# Patient Record
Sex: MALE | Race: WHITE | NOT HISPANIC OR LATINO | Employment: PART TIME | URBAN - METROPOLITAN AREA
[De-identification: names, ages, dates, MRNs, and addresses within clinical notes are randomized per-mention and may not be internally consistent; named-entity substitution may affect disease eponyms.]

---

## 2017-02-24 ENCOUNTER — OFFICE VISIT (OUTPATIENT)
Dept: URGENT CARE | Facility: URGENT CARE | Age: 47
End: 2017-02-24
Payer: COMMERCIAL

## 2017-02-24 VITALS
SYSTOLIC BLOOD PRESSURE: 116 MMHG | TEMPERATURE: 98 F | HEART RATE: 67 BPM | OXYGEN SATURATION: 97 % | WEIGHT: 182 LBS | DIASTOLIC BLOOD PRESSURE: 88 MMHG

## 2017-02-24 DIAGNOSIS — H18.821: Primary | ICD-10-CM

## 2017-02-24 DIAGNOSIS — H10.31 ACUTE BACTERIAL CONJUNCTIVITIS OF RIGHT EYE: ICD-10-CM

## 2017-02-24 PROCEDURE — 99203 OFFICE O/P NEW LOW 30 MIN: CPT | Performed by: FAMILY MEDICINE

## 2017-02-24 RX ORDER — POLYMYXIN B SULFATE AND TRIMETHOPRIM 1; 10000 MG/ML; [USP'U]/ML
1 SOLUTION OPHTHALMIC EVERY 4 HOURS
Qty: 10 ML | Refills: 0 | Status: SHIPPED | OUTPATIENT
Start: 2017-02-24 | End: 2017-03-02

## 2017-02-24 NOTE — MR AVS SNAPSHOT
After Visit Summary   2/24/2017    Jamie Lockwood    MRN: [id number]           Patient Information     Date Of Birth          1970        Visit Information        Provider Department      2/24/2017 1:00 PM Provider, [facility]        Today's Diagnoses     Contact lens overwear, right    -  1    Acute bacterial conjunctivitis of right eye          Care Instructions      Conjunctivitis Caused by Infection  Infections are caused by viruses or germs (bacteria). Treatment includes keeping your eyes and hands clean. Your health care provider may prescribe eye drops, and tell you to stay home from work or school if you re contagious. Untreated infections can be serious. It's important to see your provider for a diagnosis.    Viral infections  A cold, flu, or other virus can spread to your eyes. This causes a watery discharge. Your eyes may burn or itch and get red. Your eyelids may also be puffy and sore.  Treatment  Most viral infections go away on their own. Artificial tears and warm compresses can relieve symptoms. Your provider may also prescribe eye drops. A viral infection can be very contagious and spreads quickly. To prevent this, wash your hands often. Use a separate tissue to wipe each eye. Don t touch your eyes or share bedding or towels.   Bacterial infections  Bacterial infections often occur in one eye. There may be a watery or a thick discharge from the eye. These infections can cause serious damage to your eye if not treated promptly.  Treatment  Your provider may prescribe eye drops or ointment to kill the bacteria. Warm compresses can help keep the eyelids clean. To keep the bacteria from spreading, wash your hands often. Use a separate tissue to wipe each eye. Don t touch your eyes or share bedding or towels.    1355-6957 [employer] [address] [place] [address]. All rights reserved. This information is not intended as a substitute  "for professional medical care. Always follow your healthcare professional's instructions.              Follow-ups after your visit        Who to contact     If you have questions or need follow up information about today's clinic visit or your schedule please contact [facility] directly at [phone].  Normal or non-critical lab and imaging results will be communicated to you by MyChart, letter or phone within 4 business days after the clinic has received the results. If you do not hear from us within 7 days, please contact the clinic through MyChart or phone. If you have a critical or abnormal lab result, we will notify you by phone as soon as possible.  Submit refill requests through [employer] or call your pharmacy and they will forward the refill request to us. Please allow 3 business days for your refill to be completed.          Additional Information About Your Visit        MyChart Information     [employer] lets you send messages to your doctor, view your test results, renew your prescriptions, schedule appointments and more. To sign up, go to [email or web address] . Click on \"Log in\" on the left side of the screen, which will take you to the Welcome page. Then click on \"Sign up Now\" on the right side of the page.     You will be asked to enter the access code listed below, as well as some personal information. Please follow the directions to create your username and password.     Your access code is: [email or web address]  Expires: 2017  1:24 PM     Your access code will  in 90 days. If you need help or a new code, please call your [place] clinic or [phone].        Care EveryWhere ID     This is your Care EveryWhere ID. This could be used by other organizations to access your [place] medical records  [id number]        Your Vitals Were     Pulse Temperature Pulse Oximetry             67 98  F (36.7  C) (Tympanic) 97%          Blood Pressure from Last 3 Encounters:   17 116/88 "    Weight from Last 3 Encounters:   02/24/17 182 lb (82.6 kg)              Today, you had the following     No orders found for display         Today's Medication Changes          These changes are accurate as of: 2/24/17  1:24 PM.  If you have any questions, ask your nurse or doctor.               Start taking these medicines.        Dose/Directions    trimethoprim-polymyxin b ophthalmic solution   Commonly known as:  POLYTRIM   Used for:  Contact lens overwear, right, Acute bacterial conjunctivitis of right eye   Started by:  ProviderBertha        Dose:  1 drop   Apply 1 drop to eye every 4 hours for 6 days   Quantity:  10 mL   Refills:  0            Where to get your medicines      These medications were sent to [email or web address] [id number] [address] SEC of [facility]  [address] [address]     Phone:  [phone]     trimethoprim-polymyxin b ophthalmic solution                Primary Care Provider Office Phone # Fax #    Marco Waite [phone]       [facility] [address]  [facility] [address]        Thank you!     Thank you for choosing [facility]  for your care. Our goal is always to provide you with excellent care. Hearing back from our patients is one way we can continue to improve our services. Please take a few minutes to complete the written survey that you may receive in the mail after your visit with us. Thank you!             Your Updated Medication List - Protect others around you: Learn how to safely use, store and throw away your medicines at www.disposemymeds.org.          This list is accurate as of: 2/24/17  1:24 PM.  Always use your most recent med list.                   Brand Name Dispense Instructions for use    LIPITOR PO          trimethoprim-polymyxin b ophthalmic solution    POLYTRIM    10 mL    Apply 1 drop to eye every 4 hours for 6 days

## 2017-02-24 NOTE — NURSING NOTE
Chief Complaint   Patient presents with     Urgent Care     Eye Problem     Right eye started hurting 1 week ago. Still has not improved. Reddness, pain and swelling. No changes in vision.        Initial /88  Pulse 67  Temp 98  F (36.7  C) (Tympanic)  Wt 182 lb (82.6 kg)  SpO2 97% There is no height or weight on file to calculate BMI.  Medication Reconciliation: complete    Deborah Mae

## 2017-02-24 NOTE — PROGRESS NOTES
SUBJECTIVE:     Chief Complaint   Patient presents with     Urgent Care     Eye Problem     Right eye started hurting 1 week ago. Still has not improved. Reddness, pain and swelling. No changes in vision.      History of Present Illness:  Jamie Lockwood is a 46 year old male who presents complaining of moderate right eye mattering, pain, redness for 1 week(s).   Onset/timing: gradual, improving with taking out his contacts and then recurrence 1 day ago    Associated Signs and Symptoms: none  Treatment measures tried include: flushed with water  and stopped using contacts and used glasses     No past medical history on file.    ALLERGIES:  Review of patient's allergies indicates no known allergies.      No current outpatient prescriptions on file prior to visit.  No current facility-administered medications on file prior to visit.     Social History   Substance Use Topics     Smoking status: Never Smoker     Smokeless tobacco: Not on file     Alcohol use Not on file       No family history on file.      ROS:  INTEGUMENTARY/SKIN: NEGATIVE for worrisome rashes, moles or lesions  ENT/MOUTH: NEGATIVE for ear, mouth and throat problems  RESP:NEGATIVE for significant cough or SOB  GI: NEGATIVE for nausea, abdominal pain, heartburn, or change in bowel habits    OBJECTIVE:  /88  Pulse 67  Temp 98  F (36.7  C) (Tympanic)  Wt 182 lb (82.6 kg)  SpO2 97%  General: no acute distress  Right eye:ARABELLA, EOMI, fundi normal, corneas normal, no foreign bodies, flourescein staining is negative for  Scratch, though faint haze over the cornea, no periorbital cellulitis  Left eye: ARABELLA, EOMI, fundi normal, corneas normal, no foreign bodies, , no periorbital cellulitis     Ears: normal canals, TMs bilaterally, normal TM mobility  Nose: NORMAL - no drainage, turbinates normal in size.  Neck: supple, non-tender, free range of motion, no adenopathy    ASSESSMENT/ PLAN  Contact lens overwear, right     - trimethoprim-polymyxin b  (POLYTRIM) ophthalmic solution; Apply 1 drop to eye every 4 hours for 6 days    Acute bacterial conjunctivitis of right eye     - trimethoprim-polymyxin b (POLYTRIM) ophthalmic solution; Apply 1 drop to eye every 4 hours for 6 days      To ER if acute change in vision,

## 2017-02-24 NOTE — PATIENT INSTRUCTIONS
Conjunctivitis Caused by Infection  Infections are caused by viruses or germs (bacteria). Treatment includes keeping your eyes and hands clean. Your health care provider may prescribe eye drops, and tell you to stay home from work or school if you re contagious. Untreated infections can be serious. It's important to see your provider for a diagnosis.    Viral infections  A cold, flu, or other virus can spread to your eyes. This causes a watery discharge. Your eyes may burn or itch and get red. Your eyelids may also be puffy and sore.  Treatment  Most viral infections go away on their own. Artificial tears and warm compresses can relieve symptoms. Your provider may also prescribe eye drops. A viral infection can be very contagious and spreads quickly. To prevent this, wash your hands often. Use a separate tissue to wipe each eye. Don t touch your eyes or share bedding or towels.   Bacterial infections  Bacterial infections often occur in one eye. There may be a watery or a thick discharge from the eye. These infections can cause serious damage to your eye if not treated promptly.  Treatment  Your provider may prescribe eye drops or ointment to kill the bacteria. Warm compresses can help keep the eyelids clean. To keep the bacteria from spreading, wash your hands often. Use a separate tissue to wipe each eye. Don t touch your eyes or share bedding or towels.    [id number] [employer]. [address] [place] [address], [place] [address]. All rights reserved. This information is not intended as a substitute for professional medical care. Always follow your healthcare professional's instructions.

## 2024-05-24 DIAGNOSIS — J45.909 ASTHMA: Primary | ICD-10-CM

## 2024-06-23 ENCOUNTER — HEALTH MAINTENANCE LETTER (OUTPATIENT)
Age: 54
End: 2024-06-23

## 2025-05-29 ENCOUNTER — OFFICE VISIT (OUTPATIENT)
Dept: URGENT CARE | Facility: URGENT CARE | Age: 55
End: 2025-05-29
Payer: COMMERCIAL

## 2025-05-29 VITALS
HEART RATE: 115 BPM | TEMPERATURE: 97.5 F | RESPIRATION RATE: 16 BRPM | WEIGHT: 204.6 LBS | BODY MASS INDEX: 27.71 KG/M2 | HEIGHT: 72 IN | DIASTOLIC BLOOD PRESSURE: 78 MMHG | OXYGEN SATURATION: 96 % | SYSTOLIC BLOOD PRESSURE: 120 MMHG

## 2025-05-29 DIAGNOSIS — H10.13 ALLERGIC CONJUNCTIVITIS, BILATERAL: Primary | ICD-10-CM

## 2025-05-29 RX ORDER — OLOPATADINE HYDROCHLORIDE 2 MG/ML
1 SOLUTION OPHTHALMIC DAILY
Qty: 2.5 ML | Refills: 0 | Status: SHIPPED | OUTPATIENT
Start: 2025-05-29

## 2025-05-29 ASSESSMENT — ENCOUNTER SYMPTOMS
EYE PAIN: 0
EYE REDNESS: 1
PHOTOPHOBIA: 0
COUGH: 0
SORE THROAT: 0
EYE ITCHING: 1

## 2025-05-29 NOTE — PROGRESS NOTES
ASSESSMENT AND PLAN:      ICD-10-CM    1. Allergic conjunctivitis, bilateral  H10.13 olopatadine (PATADAY) 0.2 % ophthalmic solution        Failed treatment with topical antibiotics.  Discussed symptoms most likely related to allergies.  Allergy symptoms may be masked by his daily Allegra and Flonase.  Eye redness most likely related to allergies.  Recommended washing face with cold water twice daily.  Allergy eyedrops twice daily.  Shower after being outside in change of clothes.  Close windows and turn on AC during pollen season.    If symptoms are not controlled with these recommendations.  Could see allergist if may be committed to allergy shots.      Kristel Lee MD  [facility]    Subjective     Jamie Lockwood is a 54 year old who presents for Patient presents with:  Urgent Care: Pt reports redness in both eyes, started in the right eye but now spreading to the left eye X 2 weeks.    a new patient of [facility].    Eye Problem    Patient has had 2-week symptoms of redness in both eyes.  Initially started in the right eye but now involves the left also  Dried clear discharge initially    treatment virtual visit 2.5 weeks ago - treatment ofloxacin.  Wears contacts    Takes Allegra daily for allergies    Flonase for sinus infection prevention    Has been gardening.  Noticed a lot of pollen on his deck.  May have oak tree allergies    Treatment measures tried include - antibiotics drop  Context: working outside.        Review of Systems   HENT:  Negative for sneezing and sore throat.    Eyes:  Positive for redness and itching (some early). Negative for photophobia, pain and visual disturbance.   Respiratory:  Negative for cough.    Allergic/Immunologic: Positive for environmental allergies (sometimes).         Current Outpatient Medications   Medication Sig Dispense Refill    Atorvastatin Calcium (LIPITOR PO)       olopatadine (PATADAY) 0.2 % ophthalmic solution Place 0.05 mLs (1 drop)  into both eyes daily. 2.5 mL 0           Objective    /78   Pulse 115   Temp 97.5  F (36.4  C) (Tympanic)   Resp 16   Ht 1.829 m (6')   Wt 92.8 kg (204 lb 9.6 oz)   SpO2 96%   BMI 27.75 kg/m    Physical Exam  Vitals reviewed.   Constitutional:       Appearance: Normal appearance.   HENT:      Right Ear: Tympanic membrane normal.      Left Ear: Tympanic membrane normal.      Nose: Nose normal.      Mouth/Throat:      Mouth: Mucous membranes are moist.      Pharynx: Oropharynx is clear.   Eyes:      General:         Right eye: No discharge.         Left eye: No discharge.      Extraocular Movements: Extraocular movements intact.      Pupils: Pupils are equal, round, and reactive to light.      Comments: Scleral injection and conjunctival redness bilaterally right eye worse than left   Neurological:      Mental Status: He is alert.

## 2025-05-29 NOTE — PROGRESS NOTES
Urgent Care Clinic Visit    Urgent Care Clinic Visit    Chief Complaint   Patient presents with    Urgent Care     Pt reports redness in both eyes, started in the right eye but now spreading to the left eye X 2 weeks.               5/29/2025     4:26 PM   Additional Questions   Roomed by donald garcias   Accompanied by n/a         5/29/2025     4:26 PM   Patient Reported Additional Medications   Patient reports taking the following new medications Carvedilol 25mg, Allegra 180mg, Flonase, Montelukast (prn), Pregabalin 75mg, Tadafil, Trazadone 50mg, Ofloxacin 0.3%, Triamtorene-Hydrochlorothiazide 37.5mg                       5/29/2025     4:26 PM   Additional Questions   Roomed by donald garcias   Accompanied by n/a         5/29/2025     4:26 PM   Patient Reported Additional Medications   Patient reports taking the following new medications Carvedilol 25mg, Allegra 180mg, Flonase, Montelukast (prn), Pregabalin 75mg, Tadafil, Trazadone 50mg, Ofloxacin 0.3%, Triamtorene-Hydrochlorothiazide 37.5mg

## 2025-07-12 ENCOUNTER — HEALTH MAINTENANCE LETTER (OUTPATIENT)
Age: 55
End: 2025-07-12